# Patient Record
Sex: FEMALE | Race: BLACK OR AFRICAN AMERICAN | NOT HISPANIC OR LATINO | Employment: OTHER | URBAN - METROPOLITAN AREA
[De-identification: names, ages, dates, MRNs, and addresses within clinical notes are randomized per-mention and may not be internally consistent; named-entity substitution may affect disease eponyms.]

---

## 2018-06-12 PROBLEM — G43.909 MIGRAINE WITHOUT STATUS MIGRAINOSUS: Status: ACTIVE | Noted: 2018-06-12

## 2018-06-12 PROBLEM — R53.1 SPELL OF GENERALIZED WEAKNESS: Status: ACTIVE | Noted: 2018-06-12

## 2018-06-12 PROBLEM — R25.1 TREMORS OF NERVOUS SYSTEM: Status: ACTIVE | Noted: 2018-06-12

## 2018-10-07 ENCOUNTER — NURSE TRIAGE (OUTPATIENT)
Dept: ADMINISTRATIVE | Facility: CLINIC | Age: 33
End: 2018-10-07

## 2018-10-07 NOTE — TELEPHONE ENCOUNTER
"C/o sob    Reason for Disposition   Difficulty breathing    Answer Assessment - Initial Assessment Questions  1. ONSET: "When did the pain start?"       A sometime ago  2. LOCATION: "Where is the pain located?"       Both lower legs  3. PAIN: "How bad is the pain?"    (Scale 1-10; or mild, moderate, severe)    -  MILD (1-3): doesn't interfere with normal activities     -  MODERATE (4-7): interferes with normal activities (e.g., work or school) or awakens from sleep, limping     -  SEVERE (8-10): excruciating pain, unable to do any normal activities, unable to walk      8  4. WORK OR EXERCISE: "Has there been any recent work or exercise that involved this part of the body?"       no  5. CAUSE: "What do you think is causing the leg pain?"      Unknown/PAD  6. OTHER SYMPTOMS: "Do you have any other symptoms?" (e.g., chest pain, back pain, breathing difficulty, swelling, rash, fever, numbness, weakness)      Degenerative disc disease  7. PREGNANCY: "Is there any chance you are pregnant?" "When was your last menstrual period?"      no    Protocols used: ST LEG PAIN-A-AH      "

## 2018-10-30 PROBLEM — R53.1 SPELL OF GENERALIZED WEAKNESS: Status: RESOLVED | Noted: 2018-06-12 | Resolved: 2018-10-30
